# Patient Record
Sex: FEMALE | Race: WHITE | NOT HISPANIC OR LATINO
[De-identification: names, ages, dates, MRNs, and addresses within clinical notes are randomized per-mention and may not be internally consistent; named-entity substitution may affect disease eponyms.]

---
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
## 2021-04-14 ENCOUNTER — TRANSCRIPTION ENCOUNTER (OUTPATIENT)
Age: 21
End: 2021-04-14

## 2021-11-08 ENCOUNTER — TRANSCRIPTION ENCOUNTER (OUTPATIENT)
Age: 21
End: 2021-11-08

## 2022-08-29 ENCOUNTER — APPOINTMENT (OUTPATIENT)
Dept: PEDIATRICS | Facility: CLINIC | Age: 22
End: 2022-08-29

## 2022-08-29 VITALS — OXYGEN SATURATION: 98 %

## 2022-08-29 DIAGNOSIS — B34.9 VIRAL INFECTION, UNSPECIFIED: ICD-10-CM

## 2022-08-29 PROCEDURE — 99212 OFFICE O/P EST SF 10 MIN: CPT

## 2022-08-29 RX ORDER — BROMPHENIRAMINE MALEATE, PSEUDOEPHEDRINE HYDROCHLORIDE, 2; 30; 10 MG/5ML; MG/5ML; MG/5ML
30-2-10 SYRUP ORAL
Qty: 2 | Refills: 0 | Status: ACTIVE | COMMUNITY
Start: 2022-08-29 | End: 1900-01-01

## 2022-08-29 NOTE — REVIEW OF SYSTEMS
[Chills] : chills [Malaise] : malaise [Nasal Congestion] : nasal congestion [Sore Throat] : sore throat [Cough] : cough [Negative] : Genitourinary

## 2022-11-14 ENCOUNTER — EMERGENCY (EMERGENCY)
Facility: HOSPITAL | Age: 22
LOS: 1 days | Discharge: HOME | End: 2022-11-14
Attending: EMERGENCY MEDICINE
Payer: COMMERCIAL

## 2022-11-14 VITALS
HEART RATE: 91 BPM | DIASTOLIC BLOOD PRESSURE: 77 MMHG | OXYGEN SATURATION: 99 % | SYSTOLIC BLOOD PRESSURE: 107 MMHG | TEMPERATURE: 99 F | WEIGHT: 175.05 LBS | RESPIRATION RATE: 18 BRPM | HEIGHT: 66 IN

## 2022-11-14 PROCEDURE — 99284 EMERGENCY DEPT VISIT MOD MDM: CPT

## 2022-11-14 PROCEDURE — 99282 EMERGENCY DEPT VISIT SF MDM: CPT

## 2022-11-14 NOTE — ED PROVIDER NOTE - CLINICAL SUMMARY MEDICAL DECISION MAKING FREE TEXT BOX
Pain and vaginal bleeding on menstrual period.  no unilateral pain suspicious of torsion.  HCG negative.  hemodynamically stable.  will discharge with outpatient GYN followup.  no indication for imaging today.

## 2022-11-14 NOTE — ED PROVIDER NOTE - PROGRESS NOTE DETAILS
Macrina MOREIRA: pregnancy test negative. patient refused pain medication. low suspicion for torsion as pain is b/l and ttp is generalized to pelvic region. patient states this is a chronic issue as she experiences this pain every time she has her menstrual period. advised patient on importance of following up with gyn regarding today's visit. stable for discharge. discussed with Dr. Courtney

## 2022-11-14 NOTE — ED PROVIDER NOTE - OBJECTIVE STATEMENT
22 y/o female, currently on day two of her menstrual period, presents to the ER for menstrual cramps. states she has very painful menstrual periods. states she takes Tylenol and Motrin which does not provide relief. states has been to multiple GYNs. has had US done in past showing a cyst.  has been on OCP's and does not like how they make her feel. denies f/n/v/d, CP, SOB, LOC, numbness, tingling, dizziness, HA, urinary symptoms.

## 2022-11-14 NOTE — ED ADULT NURSE NOTE - OBJECTIVE STATEMENT
21 y female w/ no medical history presents to ED w/ mesntrual cramps. Pt is on day 2 of menstrual period. Pt states she has taken tylenol and motrin w/no pain relief. Pt also stated she had an ultrasound done that shows she has a cyst. Pt denies fever, chills, cp, sob, dizziness, light headedness.

## 2022-11-14 NOTE — ED ADULT NURSE NOTE - ISOLATION TYPE:
Patient was recently discharged from rehab, caller went for home visit today  Caller has a couple of concerns  First, patient was on Humalog 8 units three times daily, but was told on discharge from rehab that she no longer needed it  Her fasting glucose this morning was 102, and caller is having patient keep track of this daily  Also, patient had been on Lexapro but was not given any refills on discharge  Please advise  Thank you  None

## 2022-11-14 NOTE — ED PROVIDER NOTE - PATIENT PORTAL LINK FT
You can access the FollowMyHealth Patient Portal offered by [facility] by registering at the following website: [email or web address]. By joining [employer]’s FollowMyHealth portal, you will also be able to view your health information using other applications (apps) compatible with our system.

## 2022-11-14 NOTE — ED PROVIDER NOTE - NSFOLLOWUPINSTRUCTIONS_ED_ALL_ED_FT
follow up with GYN regarding today's visit  continue take Tylenol and Motrin for pain as needed     if your symptoms worsen, persist, or if any new symptoms develop, or if you experience any signs of distress, return to the ER right away.       Menstruation       Menstruation, also known as a menstrual period, is the monthly shedding of the lining of the uterus. The lining of the uterus is made up of blood, tissue, fluid, and mucus. The uterus is the organ in the lower abdomen where a baby grows during pregnancy. The flow of blood usually occurs during 3–7 consecutive days each month. Girls usually start their periods between the ages of 12 and 14, but some girls may be older or younger when they start their periods. Women continue to have periods until they reach menopause. Menopause usually occurs between the ages of 48 and 55.    A period is part of a woman's menstrual cycle, which is a series of changes that the body goes through to prepare for pregnancy. Usually, you will get your period about every 28 days if you do not get pregnant. However, some women get their periods as soon as every 21 days or as late as every 45 days. Hormones control the menstrual cycle. Hormones are chemicals that the body produces to regulate different body functions. These hormones trigger changes in your uterus. Every month, the lining of your uterus gets thicker to prepare for pregnancy. And every month that you do not get pregnant, your uterus gets rid of its thick lining and cleans itself out. This is your period.      What can I expect during my period?    Periods are different for each woman and girl. You may experience:  •Bleeding that lasts for 3–7 days. A little more or less bleeding is normal.      •Occasional heavy bleeding.      •Cramps in the lower abdomen.      •Aching or pain in the lower back area.      •Sore breasts.      •Dizziness.      •Nausea or diarrhea.      Other symptoms may occur 1 to 2 weeks before your menstrual period starts and go away a few days after menstrual bleeding begins. These symptoms are referred to as premenstrual syndrome (PMS). These symptoms can include:  •Headache.      •Breast tenderness and swelling.      •Bloating.      •Tiredness (fatigue).      •Mood changes.      •Craving for certain foods.      •Trouble concentrating.        Supplies needed:    •Tampons, sanitary pads, or menstrual cups.      •Over-the-counter pain reliever as told by your health care provider.      •Heating pad or wrap.        How to care for yourself during your period     You can capture the flow of menstrual blood using:  •Tampons. These are pieces of cotton that are usually packed into plastic or cardboard applicators. The cotton has a string attached to it. You insert the cotton inside your vagina to absorb your menstrual blood and pull the string to remove it. You must change your tampon at least every 4–8 hours.      •Sanitary pads. These are thick pads with a sticky back that you attach to your underwear to absorb menstrual blood. You must change your pad at least every 4–8 hours, or whenever you are uncomfortable.      •Menstrual cups. These are small rubber cups that you place inside of your vagina. You must remove and empty a menstrual cup at least every 8–12 hours.      To help relieve pain and discomfort during your period:  •Take an over-the-counter pain reliever as told by your health care provider.      •Use a heating pad or heat wrap on your abdomen to ease cramping.      •Exercise regularly.      •Eat a healthy diet. A healthy diet includes a lot of fruits and vegetables, low-fat dairy products, lean meats, and foods that contain fiber.    •Avoid foods and drinks that may make your symptoms worse before or during your period. This includes foods that contain:  •Caffeine.      •Salt.      •Sugar.          How do I know if my period is not normal?    Periods are different for everyone. Your period may last for a longer or shorter time than usual, and bleeding may be light or heavy. Signs that your period may not be normal include:  •Bleeding very heavily, such as soaking through a tampon or pad in 1–2 hours.      •Bleeding more than 7 days.      •Bleeding after you have sex.      •Cramps that are so painful you cannot do your daily activities.      •Cramps that get much worse than they used to be.      •Bleeding between periods.      •Missing your period for longer than 3 months.      •Your menstrual cycle becoming irregular, when it used to be regular.        Follow these instructions at home:    •Keep track of your periods by using a calendar.      •If you use tampons, use the least absorbent possible to avoid complications such as toxic shock syndrome.      • Do not leave tampons in your vagina overnight or longer than 8 hours.      •Wear a sanitary pad overnight.        Contact a health care provider if:    •You have signs that your period may not be normal.      •You develop a fever with your period.      •Your periods last more than 7 days.      •You develop clots with your period and never had clots before.      •You cannot get relief for your symptoms from over-the-counter medicine.        Get help right away if:    •Your period is so heavy that you have to change pads or tampons every 30 minutes.    •You have any symptoms of toxic shock syndrome (TSS), such as:  •A high fever.      •Vomiting or diarrhea.      •Red skin that looks like a sunburn.      •Red eyes.      •Fainting or feeling dizzy.      •Sore throat.      •Muscle aches.        If you develop any of these symptoms, visit your health care provider immediately. TSS is a serious health condition that can be caused by wearing a tampon for too long.      Summary    •Menstruation, also known as a menstrual period, is the monthly shedding of the lining of the uterus.      •During your period, you pass blood, tissue, fluid, and mucus out of your vagina.      •Keep track of your periods by using a calendar.      •Contact a health care provider if you have signs that your period may not be normal.      This information is not intended to replace advice given to you by your health care provider. Make sure you discuss any questions you have with your health care provider.      Document Revised: 08/04/2021 Document Reviewed: 08/04/2021    [employer] Patient Education © 2022 [employer]

## 2022-11-14 NOTE — ED PROVIDER NOTE - MUSCULOSKELETAL NEGATIVE STATEMENT, MLM
Left message for patient advising 7/8 infusion has been cancelled. Advised patient appt will be added after last appt. no back pain, no gout, no musculoskeletal pain, no neck pain, and no weakness.

## 2022-11-14 NOTE — ED PROVIDER NOTE - PHYSICAL EXAMINATION
pelvic exam done. chaperoned by nurse diana. patient on her menstrual period. active bleeding present. b/l adnexal tenderness. no cmt. no vaginal discharge.